# Patient Record
Sex: MALE | Race: AMERICAN INDIAN OR ALASKA NATIVE
[De-identification: names, ages, dates, MRNs, and addresses within clinical notes are randomized per-mention and may not be internally consistent; named-entity substitution may affect disease eponyms.]

---

## 2019-04-02 ENCOUNTER — HOSPITAL ENCOUNTER (EMERGENCY)
Dept: HOSPITAL 5 - ED | Age: 64
Discharge: HOME | End: 2019-04-02
Payer: COMMERCIAL

## 2019-04-02 VITALS — SYSTOLIC BLOOD PRESSURE: 142 MMHG | DIASTOLIC BLOOD PRESSURE: 102 MMHG

## 2019-04-02 DIAGNOSIS — I15.9: Primary | ICD-10-CM

## 2019-04-02 PROCEDURE — 99282 EMERGENCY DEPT VISIT SF MDM: CPT

## 2019-04-02 NOTE — EMERGENCY DEPARTMENT REPORT
Chief Complaint: High BP


Stated Complaint: HBP


Time Seen by Provider: 04/02/19 14:46





- HPI


History of Present Illness: 


64 y o Male with a Hx of HTN un controlled with medication presents for refill 

of his blood pressure medication.  Patient states he has not been taking those 

medications because he ran out.  Patient denies chest pain, fever, headache, 

blurred vision, dizziness or any other symptoms.  Patient states that he noticed

when he has not taken his low pressure medication for a while his blood pressure

goes up.  Patient states he was trying to reduce it with some modification.











- ROS


Review of Systems: 





As noted in HPI








- Exam


Vital Signs: 


GENERAL: Alert and oriented x3, no apparent distress, Normal Gait, atraumatic.


HEAD: Head is normocephalic and a-traumatic.


LUNGS: Symetrical with respiration, No wheezing, no rales or crackles, CTAB.


HEART:  S1, S2 present, regular rate and rhythm without murmur, no rubs, no 

gallops. Non tender to palpation





SKIN:  Warm and dry, No lesions, No ulceration or induration present.








MSE screening note: 


Focused history and physical exam performed.


Due to findings the following was ordered:











ED Medical Decision Making





- Medical Decision Making


64 y with a htn presents for medication refill 


He is asymptomatic in the Ed


States will follow up with pcp 


 Vital signs are stable patient is in no acute distress.


Patient will be discharged with a refill for his HCTZ which is what she normally

takes for his blood pressure.








ED Disposition for MSE


Clinical Impression: 


 Benign secondary hypertension





Disposition: DC-01 TO HOME OR SELFCARE


Is pt being admited?: No


Does the pt Need Aspirin: No


Condition: Stable


Instructions:  Hypertension (ED)


Additional Instructions: 


f/u with pcp


take your bp medications as prescribed


reduce salt intake 


Prescriptions: 


hydroCHLOROthiazide [Hctz] 12.5 mg PO QDAY #60 capsule


Referrals: 


[facility] [Outside] - 3-5 Days


[facility] [Outside] - 3-5 Days


Forms:  Accompanied Note, Work/School Release Form(ED)


Time of Disposition: 15:19